# Patient Record
Sex: MALE | Race: WHITE | NOT HISPANIC OR LATINO | URBAN - METROPOLITAN AREA
[De-identification: names, ages, dates, MRNs, and addresses within clinical notes are randomized per-mention and may not be internally consistent; named-entity substitution may affect disease eponyms.]

---

## 2018-09-27 ENCOUNTER — EMERGENCY (EMERGENCY)
Facility: HOSPITAL | Age: 33
LOS: 1 days | Discharge: HOME | End: 2018-09-27
Attending: EMERGENCY MEDICINE
Payer: COMMERCIAL

## 2018-09-27 VITALS
OXYGEN SATURATION: 100 % | RESPIRATION RATE: 18 BRPM | TEMPERATURE: 99 F | SYSTOLIC BLOOD PRESSURE: 149 MMHG | DIASTOLIC BLOOD PRESSURE: 87 MMHG | HEART RATE: 114 BPM

## 2018-09-27 VITALS — HEIGHT: 70 IN | WEIGHT: 179.9 LBS

## 2018-09-27 DIAGNOSIS — Z98.89 OTHER SPECIFIED POSTPROCEDURAL STATES: Chronic | ICD-10-CM

## 2018-09-27 PROCEDURE — 99284 EMERGENCY DEPT VISIT MOD MDM: CPT

## 2018-09-27 PROCEDURE — 70450 CT HEAD/BRAIN W/O DYE: CPT | Mod: 26

## 2018-09-27 PROCEDURE — 70450 CT HEAD/BRAIN W/O DYE: CPT

## 2018-09-27 NOTE — ED STATDOCS - PROGRESS NOTE DETAILS
called by nurse to evaluate for triage; well appearing young healthy male with c/o central focused vision disturbance in left eye; not described as curtain being drawn, not black, and isolated to central area of visual field; otherwise feels well, neuro intact; normal speech, normal gait, FROM x 4 ext; PERRLA, no APD; head ct, and may remain in CT for further eval

## 2018-09-27 NOTE — ED STATDOCS - MEDICAL DECISION MAKING DETAILS
normal imaging and exam; +corneal abrasion likely from contacts, but unsure if that explains visions changes; ocufloc gtts given; has optho in [place] can see tomorrow; ok for d/c with prompt f/u

## 2018-09-27 NOTE — ED STATDOCS - OBJECTIVE STATEMENT
33 yo male no sign PMHX presents with several hrs of vision change in left eye; patient wears contacts; felt something "wrong" with the contact, took it out, put it back in; that was earlier this afternoon; shortly thereafter, noticed change to vision, floaters, and then progressed to central vision disturbance; does not describe curtain coming down; describes it as central punctate vision loss with good maintenace of peripheral vision; has no other symptoms; no headache, no dizziness, no focal weakness; normal speech;

## 2018-09-27 NOTE — ED STATDOCS - PHYSICAL EXAMINATION
Eyes: vision 20/20 right eye; 20/100 left eye; EOMI; PERRLA; no APD; visual fields intact b/l with loss of punctate area of central vision; on fundoscopic exam, no pallor of retina noted; bedside sono without obvious evidence of retinal detachment; on fluroescene stain +scattered uptake between 3 and 6 o'clock position; on slit lamp exam, no other corneal lesions, no cell/flare

## 2018-09-27 NOTE — ED ADULT TRIAGE NOTE - CHIEF COMPLAINT QUOTE
pt reports over past couple days increasing loss of site to left eye, now vision in left eye has narrows. pt reports over past couple days flashing in left eye, increasing loss of site and now tonight vision in left eye has narrowed.

## 2019-12-09 ENCOUNTER — TRANSCRIPTION ENCOUNTER (OUTPATIENT)
Age: 34
End: 2019-12-09

## 2021-06-20 ENCOUNTER — TRANSCRIPTION ENCOUNTER (OUTPATIENT)
Age: 36
End: 2021-06-20

## 2023-11-27 ENCOUNTER — EMERGENCY (EMERGENCY)
Facility: HOSPITAL | Age: 38
LOS: 1 days | Discharge: HOME | End: 2023-11-27
Attending: EMERGENCY MEDICINE
Payer: COMMERCIAL

## 2023-11-27 VITALS
HEIGHT: 70 IN | DIASTOLIC BLOOD PRESSURE: 87 MMHG | OXYGEN SATURATION: 97 % | SYSTOLIC BLOOD PRESSURE: 156 MMHG | TEMPERATURE: 98 F | HEART RATE: 83 BPM | RESPIRATION RATE: 20 BRPM | WEIGHT: 238.32 LBS

## 2023-11-27 DIAGNOSIS — Z98.89 OTHER SPECIFIED POSTPROCEDURAL STATES: Chronic | ICD-10-CM

## 2023-11-27 PROCEDURE — 99284 EMERGENCY DEPT VISIT MOD MDM: CPT | Mod: 25

## 2023-11-27 PROCEDURE — 65205 REMOVE FOREIGN BODY FROM EYE: CPT | Mod: LT

## 2023-11-27 PROCEDURE — 67938 REMOVE EYELID FOREIGN BODY: CPT | Mod: LT

## 2023-11-27 PROCEDURE — 99283 EMERGENCY DEPT VISIT LOW MDM: CPT | Mod: 25

## 2023-11-27 RX ORDER — OFLOXACIN 0.3 %
1 DROPS OPHTHALMIC (EYE) ONCE
Refills: 0 | Status: COMPLETED | OUTPATIENT
Start: 2023-11-27 | End: 2023-11-27

## 2023-11-27 RX ORDER — IBUPROFEN 200 MG
600 TABLET ORAL ONCE
Refills: 0 | Status: COMPLETED | OUTPATIENT
Start: 2023-11-27 | End: 2023-11-27

## 2023-11-27 RX ORDER — ACETAMINOPHEN 500 MG
650 TABLET ORAL ONCE
Refills: 0 | Status: COMPLETED | OUTPATIENT
Start: 2023-11-27 | End: 2023-11-27

## 2023-11-27 RX ADMIN — Medication 1 DROP(S): at 03:18

## 2023-11-27 RX ADMIN — Medication 600 MILLIGRAM(S): at 03:18

## 2023-11-27 RX ADMIN — Medication 650 MILLIGRAM(S): at 03:18

## 2023-11-27 NOTE — ED PROVIDER NOTE - NS ED ATTENDING STATEMENT MOD
This was a shared visit with the HLEEN. I reviewed and verified the documentation and independently performed the documented:

## 2023-11-27 NOTE — ED PROVIDER NOTE - OBJECTIVE STATEMENT
37 yo male no PMHx presents to ED c/o left eye discomfort. Debris from [place] tree fell in eye yesterday. Irrigated it yesterday with worsening of sxms. Wears contacts, but was wearing glasses at time of incident. No further complaints at this time.  Denies changes in vision.

## 2023-11-27 NOTE — ED ADULT TRIAGE NOTE - CHIEF COMPLAINT QUOTE
Patient presents to ED with c/o left eye pain.  Per patient, he was putting up [place] lights and something fell into his eye around noon yesterday.  Flushed it with eye wash with no relief.

## 2023-11-27 NOTE — ED ADULT NURSE NOTE - OBJECTIVE STATEMENT
A&OX4, presents to ED with c/o of left eye pain. Patient says " I was putting up [place] lights and something fell into his eye yesterday and since has been feeling pain".  Patient left eye appears slightly pink. No pus or drainage noted. ANDERS Barajas at bedside assessing patient.

## 2023-11-27 NOTE — ED PROVIDER NOTE - PATIENT PORTAL LINK FT
You can access the FollowMyHealth Patient Portal offered by [facility] by registering at the following website: [email or web address]. By joining [employer]’s FollowMyHealth portal, you will also be able to view your health information using other applications (apps) compatible with our system.

## 2023-11-27 NOTE — ED PROVIDER NOTE - ATTENDING APP SHARED VISIT CONTRIBUTION OF CARE
38-year-old male; with no past medical history presenting with left eye injury status post [place] tree hitting patient in left eye.  Patient complaining of pain and foreign body sensation.  Patient wears contacts but was wearing glasses at time of incident.  Complaining of pain up to eye.  Denies any blurry vision or double vision.  EXAM: EOMI. l eye with corneal abrasion at 1-3 oclock area  A/P:  38yoM p/w left eye corneal abrasion  -abx, f/up with ophtho in AM.

## 2023-11-27 NOTE — ED PROVIDER NOTE - CLINICAL SUMMARY MEDICAL DECISION MAKING FREE TEXT BOX
39 yo male no PMHx presents to ED c/o left eye discomfort after debris from tree fell into eye. No visual changes. Patient with large superficial corneal abrasion 2/2 debris underneath upper eyelid which was removed. Stressed importance with patient regarding compliance with abx to prevent corneal ulceration, strict glasses use, and follow up with established ophthalmologist. Medically stable for discharge.

## 2023-11-27 NOTE — ED PROVIDER NOTE - NSFOLLOWUPINSTRUCTIONS_ED_ALL_ED_FT
- Ofloxacin: instill 1-2 drops to left eye 4x/day for 7 days.  - Ibuprofen 600mg every 6 hours as needed for pain.  - Acetaminophen 650mg every 6 hours as needed for pain.   - Strict no contact use. Glasses only.  - Please call today to schedule followup appointment with your ophthalmologist.   - Please bring all documentation from your ED visit to any related future follow up appointment.  - Please call to schedule follow up appointment with your primary care physician within 24-48 hours.  - Please seek immediate medical attention for any new/worsening, signs/symptoms, or concerns.    Feel better!     Corneal Abrasion     WHAT YOU NEED TO KNOW:    What is a corneal abrasion? A corneal abrasion is a scratch on the cornea of your eye. The cornea is the clear layer that covers the front of your eye.    Eye Anatomy         What causes a corneal abrasion?   •Contact lenses that do not fit well or are worn too long      •A scratch or poke from a fingernail or objects such as a pencil or tree branch      •Objects such as dirt, sand, or metal shavings that get into your eye      •Rubbing your eyes too hard      What are the signs and symptoms of a corneal abrasion?   •Pain      •More tears than usual      •Redness      •A feeling that you have something in your eye      •Blurred vision      •Sensitivity to bright light      •Headache      How is a corneal abrasion diagnosed? Your healthcare provider will use an instrument to examine your eye. If you have something in your eye that is scratching your cornea, your healthcare provider will remove it. He or she may put dye in your eye and look at it with a lighted instrument. The light and dye can help your provider see if your cornea has been scratched.    How is a corneal abrasion treated? You may be given antibiotic eyedrops or ointment to help prevent an eye infection. You may also be given eyedrops to decrease pain. A small scratch may heal in 1 to 2 days. Deeper or larger scratches may take longer to heal.    What can I do to care for my eyes?   •Get regular eye exams. Get your eyes checked at least every year.      •Eat healthy foods. Fresh fruits and vegetables that are rich in vitamins A and C may help with your vision. Foods such as sweet potatoes, apricots, and carrots are rich in good nutrients for the eyes.      •Take care of your contacts or glasses. Store, clean, and use your contacts or glasses as directed. Replace your glasses or contact lenses as often as your healthcare provider suggests.      •Decrease eye strain. Rest your eyes, especially after you read or sew for long periods of time. Get plenty of sleep at night. Use lights that reduce glare in your home, school, or workplace.      •Use eyedrops safely. If your treatment plan includes eyedrops, it is important to use them as directed. Your provider may give you detailed instructions to follow. The eyedrops may also come with safety instructions. Follow all instructions to help prevent an infection. Do not touch the tip of the bottle to your eye. Germs from your eye can spread to the medicine bottle.  Steps 1 2 3 4           How can I help prevent corneal abrasions?   •Remove your contact lenses if your eyes feel dry or irritated.      •Wash your hands if you need to touch your eyes or your face.      •Trim your child's fingernails so he or she cannot scratch his or her eye.      •Wear protective eyewear when you work with chemicals, wood, dust, or metal.      •Wear protective eyewear when you play sports.      •Do not wear your contacts for longer than you should.      •Do not wear colored lenses or lenses with shapes on them. These lenses may cause eye damage and vision loss.      •Do not wear glitter makeup. Glitter can easily get into your eyes and under contact lenses.      •Do not sleep with your contacts in.      When should I call my healthcare provider or ophthalmologist?   •Your eye pain or vision gets worse.       •You have yellow or green drainage from your eye.       •You have questions or concerns about your condition or care.       CARE AGREEMENT:    You have the right to help plan your care. Learn about your health condition and how it may be treated. Discuss treatment options with your healthcare providers to decide what care you want to receive. You always have the right to refuse treatment.

## 2023-11-27 NOTE — ED PROVIDER NOTE - PHYSICAL EXAMINATION
Gen: Nontoxic, well appearing, in NAD.  Skin: Warm and dry as visualized.  Head: NC/AT.  Eyes: Small foreign body to underneath left upper eyelid. Conjunctivae erythema. PERRLA. EOMI.  Wood's Lamp: Tetracaine ophthalmic drops and fluorescein stain applied to left eye. Visualization using Wood's Lamp. Large superficial abrasion to 2 o'clock position.   Neck: Supple, FROM. Trachea midline.   Resp: No distress.  Cardio: Well perfused.  Ext: No deformities. MAEx4. FROM.   Neuro: A&Ox3. Appears nonfocal.   Psych: Normal affect and mood.

## 2023-11-27 NOTE — ED ADULT TRIAGE NOTE - ARRIVAL FROM
Home
"You can access the [facility] Patient Portal, offered by [facility], by registering with the following website: [email or web address]"

## 2024-07-22 ENCOUNTER — NON-APPOINTMENT (OUTPATIENT)
Age: 39
End: 2024-07-22

## 2024-12-27 ENCOUNTER — NON-APPOINTMENT (OUTPATIENT)
Age: 39
End: 2024-12-27

## 2025-02-10 NOTE — ED ADULT NURSE NOTE - CHIEF COMPLAINT QUOTE
Detail Level: Simple Size Of Lesion In Cm (Optional): 0 Detail Level: Zone Detail Level: Generalized pt reports over past couple days flashing in left eye, increasing loss of site and now tonight vision in left eye has narrowed.